# Patient Record
(demographics unavailable — no encounter records)

---
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
## 2017-02-06 NOTE — DIAGNOSTIC IMAGING REPORT
Left humerus (3 views)



HISTORY: Pain, prior surgery



The exam demonstrates an orthopedic fixation plate and screws traversing

the distal shaft humerus. Findings are associated with a transverse

fracture with incomplete bony a new. Callus formation noted.



IMPRESSION:

1. Surgical fixation associated with a fracture of the distal humerus

shaft with incomplete bony union.

## 2017-02-06 NOTE — DIAGNOSTIC IMAGING REPORT
Portable chest x-ray



History: Cough



Allowing for portable technique the heart size is normal. No focal

pulmonary parenchymal processes. No hilar or mediastinal abnormalities.



Impression: No acute abnormalities.

## 2017-04-14 NOTE — DIAGNOSTIC IMAGING REPORT
Left shoulder 3 views



Indication: Trauma



Comparison: Left humerus x-rays on 2/5/2017



Findings: There is evidence of previous fracture fixation of the left

midhumerus with interval surrounding callus formation. No evidence of an

acute fracture or significant focal soft tissue swelling. Limited

evaluation of the hardware demonstrates no gross hardware loosening.

Mild degenerative changes are noted.



Impression:



Evidence of previous fracture fixation of the left humerus with

surrounding healing and callus formation. 



No acute fracture is identified. Consider additional dedicated left

humerus views, if indicated.



In the setting of trauma, if clinical symptoms persist and there is

continued concern for an occult fracture, follow up exams in 5-7 days is

suggested.
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
